# Patient Record
Sex: MALE | Race: WHITE
[De-identification: names, ages, dates, MRNs, and addresses within clinical notes are randomized per-mention and may not be internally consistent; named-entity substitution may affect disease eponyms.]

---

## 2019-07-11 ENCOUNTER — HOSPITAL ENCOUNTER (OUTPATIENT)
Dept: HOSPITAL 59 - HOP | Age: 44
Discharge: HOME | End: 2019-07-11
Attending: INTERNAL MEDICINE
Payer: COMMERCIAL

## 2019-07-11 DIAGNOSIS — Z12.11: Primary | ICD-10-CM

## 2019-07-11 DIAGNOSIS — K57.30: ICD-10-CM

## 2019-07-11 DIAGNOSIS — K52.9: ICD-10-CM

## 2019-07-12 NOTE — OPERATIVE NOTE
OPERATION: Initial screening COLONOSCOPY.



PREOPERATIVE DIAGNOSIS: Colon cancer screening, average risk. 



POSTOPERATIVE DIAGNOSES:

1. Sigmoid diverticulosis mild to moderate. 

2. Patchy left colonic erythema perhaps related to ischemia. 



PROCEDURE: After informed consent was obtained from the patient, he was placed 
in the left lateral decubitus position in the endoscopy suite, sedated and 
monitored by the department of anesthesia. Digital rectal exam was unremarkable.
A well-lubricated [id number] colonoscope was inserted into the rectum and advanced 
to the cecum. Preparation quality was good to excellent. The cecum, cecal bulb, 
ileocecal valve, appendiceal orifice, ascending colon, and transverse colon were
unremarkable. No polyps, mass lesions, or inflammation was seen. The descending 
colon demonstrated mild patchy erythema perhaps ischemia-related from the 
preparation? A few biopsies were obtained from the erythematous portions of the 
descending colon. The sigmoid colon demonstrated mild-to-moderate diverticular 
changes but no inflammation was seen. No polyps were seen. No masses were seen. 
The rectum was unremarkable in forward and in J-turn views. The endoscope was 
straightened, the rectal ampulla deflated, and the endoscope was removed. 



RECOMMENDATIONS: The patient should follow a high-fiber diet. I recommend a 
repeat exam in 10 years or sooner should symptoms warrant. 



As always, thank you for allowing me to participate in the healthcare of your 
patients. 

LISA

## 2019-08-26 ENCOUNTER — HOSPITAL ENCOUNTER (EMERGENCY)
Dept: HOSPITAL 59 - ER | Age: 44
Discharge: HOME | End: 2019-08-26
Payer: COMMERCIAL
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
## 2019-08-26 DIAGNOSIS — S91.311A: Primary | ICD-10-CM

## 2019-08-26 PROCEDURE — 96372 THER/PROPH/DIAG INJ SC/IM: CPT

## 2019-08-26 PROCEDURE — 90715 TDAP VACCINE 7 YRS/> IM: CPT

## 2019-08-26 PROCEDURE — 99284 EMERGENCY DEPT VISIT MOD MDM: CPT

## 2019-08-26 PROCEDURE — 12001 RPR S/N/AX/GEN/TRNK 2.5CM/<: CPT

## 2019-08-26 PROCEDURE — 99283 EMERGENCY DEPT VISIT LOW MDM: CPT

## 2019-08-26 NOTE — EMERGENCY DEPARTMENT RECORD
History of Present Illness





- General


Chief Complaint: Wound, puncture


Stated Complaint: RT FOOT PUNCTURE /LAC


Time Seen by Provider: 19 22:52


Source: Patient


Mode of Arrival: Ambulatory


Limitations: No limitations





- History of Present Illness


Initial Commments: 





43 yo male presents to ED for evaluation following a laceration to the right 

posterio-medial foot that occurred approximately 20 minutes prior to arrival.  

Patient reports localized bleeding and pain symptoms, stopped with pressure 

bandage prior to arrival.  Patient denies other injury on examination, denies 

health problems at his baseline.  Tetanus is > 5 years.


Onset/Timin


-: Minutes(s)


Location: Other


Extremity Location: Right: Foot


Place: Home


Context: Power tool use


Associated Symptoms: None





- [place] Coma Scale


Eye Response: (4) Open spontaneously


Motor Response: (6) Obeys commands


Verbal Response: (5) Oriented


Jane Total: 15





- Related Data


Hx Tetanus Toxoid Vaccination: No


Patient Tetanus UTD (within 5 yrs): No


                                Home Medications











 Medication  Instructions  Recorded  Confirmed  Last Taken


 


No Home Med [NO HOME MEDS]  19 Unknown











                                    Allergies











Allergy/AdvReac Type Severity Reaction Status Date / Time


 


No Known Drug Allergies Allergy   Verified 19 14:46














Travel Screening





- Travel/Exposure Within Last 30 Days


Have you traveled within the last 30 days?: No





- Travel/Exposure Within Last Year


Have you traveled outside the [place] in the last year?: No





- Additonal Travel Details


Have you been exposed to anyone with a communicable illness?: No





- Travel Symptoms


Symptom Screening: None





Review of Systems


Constitutional: Denies: Chills, Fever, Malaise, Night sweats


Eyes: Denies: Eye discharge, Eye pain


ENT: Denies: Congestion, Ear pain, Epistaxis


Respiratory: Denies: Cough, Dyspnea


Cardiovascular: Denies: Chest pain, Dyspnea on exertion


Endocrine: Denies: Fatigue, Heat or cold intolerance


Gastrointestinal: Denies: Abdominal pain, Nausea, Vomiting


Genitourinary: Denies: Incontinence, Retention


Musculoskeletal: Denies: Arthralgia, Back pain


Skin: Reports: Other (Laceration to the right medial foot).  Denies: Bruising, 

Change in color, Change in hair/nails


Neurological: Denies: Abnormal gait, Confusion, Headache, Tingling, Tremors


Psychiatric: Denies: Anxiety


Hematological/Lymphatic: Denies: Anemia, Blood Clots





Past Medical History





- SOCIAL HISTORY


Smoking Status: Never smoker


Alcohol Use: None


Drug Use: None





- RESPIRATORY


Hx Respiratory Disorders: No





- CARDIOVASCULAR


Hx Cardio Disorders: No





- NEURO


Hx Neuro Disorders: Yes


Hx of Migraines: Yes





- GI


Hx GI Disorders: No





- 


Hx Genitourinary Disorders: No





- ENDOCRINE


Hx Endocrine Disorders: No





- MUSCULOSKELETAL


Hx Musculoskeletal Disorders: Yes


Hx Arthritis: Yes





- PSYCH


Hx Psych Problems: No





- HEMATOLOGY/ONCOLOGY


Hx Hematology/Oncology Disorders: No





Family Medical History


Any Significant Family History?: No





Physical Exam





- General


General Appearance: Alert, Oriented x3, Cooperative, Mild distress


Limitations: No limitations





- Head


Head exam: Atraumatic, Normocephalic, Normal inspection


Head exam detail: negative: Abrasion, Contusion, Mosley's sign, General 

tenderness, Hematoma, Laceration





- Eye


Eye exam: Normal appearance.  negative: Conjunctival injection, Periorbital 

swelling, Periorbital tenderness, Scleral icterus





- ENT


Ear exam: negative: Auricular hematoma, Auricular trauma


Nasal Exam: negative: Active bleeding, Discharge, Dried blood, Foreign body


Mouth exam: negative: Drooling, Laceration, Muffled voice, Tongue elevation





- Neck


Neck exam: Normal inspection.  negative: Meningismus, Tenderness





- Respiratory


Respiratory exam: Normal lung sounds bilaterally.  negative: Respiratory 

distress, Rhonchi, Stridor, Wheezes





- Cardiovascular


Cardiovascular Exam: Regular rate, Normal rhythm, Normal heart sounds





- GI/Abdominal


GI/Abdominal exam: Soft.  negative: Rebound, Rigid, Tenderness





- Rectal


Rectal exam: Deferred





- 


 exam: Deferred





- Extremities


Extremities exam: Tenderness, Other (1.0 cm superficial laceration to the right 

posterio-medial foot, no active bleeding, no FB present in the wound.  Achilles 

intact.).  negative: Calf tenderness, Pedal edema





- Back


Back exam: Denies: CVA tenderness (R), CVA tenderness (L)





- Neurological


Neurological exam: Alert, Normal gait, Oriented X3





- Psychiatric


Psychiatric exam: Normal affect, Normal mood





- Skin


Skin exam: Normal color.  negative: Abrasion


Type of lesion: negative: abrasion





Course





                                   Vital Signs











  19





  22:52


 


Temperature 98.8 F


 


Pulse Rate [ 72





Left] 


 


Respiratory 16





Rate 


 


Blood Pressure 114/81





[Left] 


 


Pulse Ox 100














- Reevaluation(s)


Reevaluation #1: 





19 23:03


Procedure Note:


Wound was cleaned with Hibiclens solution, wound was then closed with Dermabond 

solution.  Instructions on skin adhesive care were discussed with the patient, 

appears stable for discharge at this time.





Disposition


Disposition: Discharge


Clinical Impression: 


Laceration of right foot


Qualifiers:


 Encounter type: initial encounter Qualified Code(s): S91.311A - Laceration 

without foreign body, right foot, initial encounter





Disposition: Home, Self-Care


Condition: (2) Stable


Instructions:  Skin Adhesive Care (ED)


Additional Instructions: 


Return to ED if your symptoms worsen or if you have any concerns.


Follow-up with your family doctor in 3-5 days as directed.


Forms:  Patient Portal Access


Time of Disposition: 22:57





Quality





- Quality Measures


Quality Measures: N/A





- Blood Pressure Screening


Does Patient Have Any of the Following: No


Blood Pressure Classification: Pre-Hypertensive BP Reading


Systolic Measurement: 114


Diastolic Measurement: 81


Screening for High Blood Pressure: < Pre-Hypertensive BP, F/U Documented > 

[]


Pre-Hypertensive Follow-up Interventions: Referral to alternative/primary care 

provider.